# Patient Record
Sex: MALE | Race: WHITE | Employment: OTHER | URBAN - METROPOLITAN AREA
[De-identification: names, ages, dates, MRNs, and addresses within clinical notes are randomized per-mention and may not be internally consistent; named-entity substitution may affect disease eponyms.]

---

## 2017-04-03 ENCOUNTER — HOSPITAL ENCOUNTER (EMERGENCY)
Age: 72
Discharge: HOME | End: 2017-04-03
Attending: EMERGENCY MEDICINE
Payer: MEDICARE

## 2017-04-03 VITALS
HEIGHT: 72 IN | RESPIRATION RATE: 16 BRPM | BODY MASS INDEX: 23.03 KG/M2 | HEART RATE: 83 BPM | OXYGEN SATURATION: 97 % | WEIGHT: 170 LBS | SYSTOLIC BLOOD PRESSURE: 158 MMHG | TEMPERATURE: 98 F | DIASTOLIC BLOOD PRESSURE: 85 MMHG

## 2017-04-03 DIAGNOSIS — S86.112A GASTROCNEMIUS MUSCLE TEAR, LEFT, INITIAL ENCOUNTER: Primary | ICD-10-CM

## 2017-04-03 PROCEDURE — 74011250637 HC RX REV CODE- 250/637: Performed by: EMERGENCY MEDICINE

## 2017-04-03 PROCEDURE — 99284 EMERGENCY DEPT VISIT MOD MDM: CPT

## 2017-04-03 RX ORDER — NAPROXEN 250 MG/1
375 TABLET ORAL
Status: COMPLETED | OUTPATIENT
Start: 2017-04-03 | End: 2017-04-03

## 2017-04-03 RX ORDER — HYDROCODONE BITARTRATE AND ACETAMINOPHEN 5; 325 MG/1; MG/1
1 TABLET ORAL
Status: COMPLETED | OUTPATIENT
Start: 2017-04-03 | End: 2017-04-03

## 2017-04-03 RX ORDER — NAPROXEN 375 MG/1
375 TABLET ORAL 2 TIMES DAILY WITH MEALS
Qty: 20 TAB | Refills: 0 | Status: SHIPPED | OUTPATIENT
Start: 2017-04-03 | End: 2018-03-09

## 2017-04-03 RX ORDER — HYDROCODONE BITARTRATE AND ACETAMINOPHEN 5; 325 MG/1; MG/1
1 TABLET ORAL
Qty: 8 TAB | Refills: 0 | Status: SHIPPED | OUTPATIENT
Start: 2017-04-03 | End: 2018-03-09

## 2017-04-03 RX ADMIN — HYDROCODONE BITARTRATE AND ACETAMINOPHEN 1 TABLET: 5; 325 TABLET ORAL at 12:54

## 2017-04-03 RX ADMIN — NAPROXEN 375 MG: 250 TABLET ORAL at 12:55

## 2017-04-03 NOTE — DISCHARGE INSTRUCTIONS
Muscle Strain: Care Instructions  Your Care Instructions  A muscle strain happens when you overstretch, or pull, a muscle. It can happen when you exercise or lift something or when you have an accident. Rest and other home care can help the muscle heal.  Follow-up care is a key part of your treatment and safety. Be sure to make and go to all appointments, and call your doctor if you are having problems. Its also a good idea to know your test results and keep a list of the medicines you take. How can you care for yourself at home? · Rest the strained muscle. Do not put weight on it for a day or two. If your doctor advises you to, use crutches or a sling to rest a sore limb. · Put ice or a cold pack on the sore muscle for 10 to 20 minutes at a time to stop swelling. Put a thin cloth between the ice pack and your skin. · Prop up the sore arm or leg on a pillow when you ice it or anytime you sit or lie down during the next 3 days. Try to keep it above the level of your heart. This will help reduce swelling. · Take pain medicines exactly as directed. ¨ If the doctor gave you a prescription medicine for pain, take it as prescribed. ¨ If you are not taking a prescription pain medicine, ask your doctor if you can take an over-the-counter medicine. · Do not do anything that makes the pain worse. Return to exercise gradually as you feel better. When should you call for help? Call your doctor now or seek immediate medical care if:  · You have new severe pain. · Your injured limb is cool or pale or changes color. · You have tingling, weakness, or numbness in your injured limb. · You cannot move the injured area. Watch closely for changes in your health, and be sure to contact your doctor if:  · You cannot put weight on a joint, or it feels unsteady when you walk. · Pain and swelling get worse or do not start to get better after 2 days of home treatment. Where can you learn more?   Go to http://amadou-tylor.info/. Enter S521 in the search box to learn more about \"Muscle Strain: Care Instructions. \"  Current as of: May 23, 2016  Content Version: 11.2  © 9319-9180 [employer]. Care instructions adapted under license by [employer] (which disclaims liability or warranty for this information). If you have questions about a medical condition or this instruction, always ask your healthcare professional. [address] any warranty or liability for your use of this information. Calf Strain: Rehab Exercises  Your Care Instructions  Here are some examples of typical rehabilitation exercises for your condition. Start each exercise slowly. Ease off the exercise if you start to have pain. Your doctor or physical therapist will tell you when you can start these exercises and which ones will work best for you. How to do the exercises  Calf wall stretch (back knee straight)    1. Stand facing a wall with your hands on the wall at about eye level. Put your affected leg about a step behind your other leg. 2. Keeping your back leg straight and your back heel on the floor, bend your front knee and gently bring your hip and chest toward the wall until you feel a stretch in the calf of your back leg. 3. Hold the stretch for at least 15 to 30 seconds. 4. Repeat 2 to 4 times. Calf wall stretch (knees bent)    1. Stand facing a wall with your hands on the wall at about eye level. Put your affected leg about a step behind your other leg. 2. Keeping both heels on the floor, bend both knees. Then gently bring your hip and chest toward the wall until you feel a stretch in the calf of your back leg. 3. Hold the stretch for at least 15 to 30 seconds. 4. Repeat 2 to 4 times. Bilateral calf stretch (knees straight)    1. Place a book on the floor a few inches from a wall or countertop, and put the balls of your feet on it.  Your heels should be on the floor. The book needs to be thick enough so that you can feel a gentle stretch in each calf. If you are not steady on your feet, hold on to a chair, counter, or wall while you do this stretch. 2. Keep your knees straight, and lean forward until you feel a stretch in each calf. 3. To get more stretch, add another book or use a thicker book, such as a phone book, a dictionary, or an encyclopedia. 4. Hold the stretch for at least 15 to 30 seconds. 5. Repeat 2 to 4 times. Bilateral calf stretch (knees bent)    1. Place a book on the floor a few inches from a wall or countertop, and put the balls of your feet on it. Your heels should be on the floor. The book needs to be thick enough so that you can feel a gentle stretch in each calf. If you are not steady on your feet, hold on to a chair, counter, or wall while you do this stretch. 2. Bend your knees, and lean forward until you feel a stretch in each calf. 3. To get more stretch, add another book or use a thicker book, such as a phone book, a dictionary, or an encyclopedia. 4. Hold the stretch for at least 15 to 30 seconds. 5. Repeat 2 to 4 times. Ankle plantarflexion    1. Sit with your affected leg straight and supported on the floor. Your other leg should be bent, with that foot flat on the floor. 2. Keeping your affected leg straight, gently flex your foot downward so your toes are pointed away from your body. Then slowly relax your foot to the starting position. 3. Repeat 8 to 12 times. Ankle dorsiflexion    1. Sit with your affected leg straight and supported on the floor. Your other leg should be bent, with that foot flat on the floor. 2. Keeping your leg straight, gently flex your foot back so your toes point upward. Then slowly relax your foot to the starting position. 3. Repeat 8 to 12 times. Bilateral heel raises on step    1. Stand on the bottom step of a staircase, facing up toward the stairs. Put the balls of your feet on the step.  If you are not steady on your feet, hold on to the banister or wall. 2. Keeping both knees straight, slowly lift your heels above the step so that you are standing on your toes. Then slowly lower your heels below the step and toward the floor. 3. Return to the starting position, with your feet even with the step. 4. Repeat 8 to 12 times. Follow-up care is a key part of your treatment and safety. Be sure to make and go to all appointments, and call your doctor if you are having problems. It's also a good idea to know your test results and keep a list of the medicines you take. Where can you learn more? Go to http://amadou-tylor.info/. Enter V478 in the search box to learn more about \"Calf Strain: Rehab Exercises. \"  Current as of: May 23, 2016  Content Version: 11.2  © 1908-1985 [employer]. Care instructions adapted under license by [employer] (which disclaims liability or warranty for this information). If you have questions about a medical condition or this instruction, always ask your healthcare professional. [address] any warranty or liability for your use of this information.

## 2017-04-03 NOTE — ED NOTES
Patient arrived through triage c/o right calf pain after feeling a pop in the posterior lateral calf while pushing a boat on a trailer. Pain increases with plantar flexion and weight baring. Patient is resting in bed, bed in low position, call bell in reach, visitor at bedside.

## 2017-04-03 NOTE — ED PROVIDER NOTES
HPI Comments: 70 y.o. male with past medical history significant for cancer (prostate, skin) and HTN who presents with chief complaint of calf pain. Earlier today, patient was pushing a \"fishing boat off of his trailer\" when he felt a \"pop\" in his left calf. Since then, patient has complained of pain to the left calf, which markedly worsens with any plantar flexion or weight bearing. Patient otherwise denies any additional injury or complaints. There are no other acute medical concerns at this time. Social hx: +tobacco smoker; +  PCP: No primary care provider on file. Note written by Alex Mullins, as dictated by Niraj Ponce. Rayne Cespedes MD 12:35 PM    The history is provided by the patient. No past medical history on file. No past surgical history on file. No family history on file. Social History     Social History    Marital status: N/A     Spouse name: N/A    Number of children: N/A    Years of education: N/A     Occupational History    Not on file. Social History Main Topics    Smoking status: Not on file    Smokeless tobacco: Not on file    Alcohol use Not on file    Drug use: Not on file    Sexual activity: Not on file     Other Topics Concern    Not on file     Social History Narrative         ALLERGIES: Review of patient's allergies indicates not on file. Review of Systems   Constitutional: Negative for chills and fever. HENT: Negative for ear pain and sore throat. Eyes: Negative for pain. Respiratory: Negative for chest tightness and shortness of breath. Cardiovascular: Negative for chest pain and leg swelling. Gastrointestinal: Negative for abdominal pain, nausea and vomiting. Genitourinary: Negative for dysuria and flank pain. Musculoskeletal: Negative for back pain. Skin: Negative for rash. Neurological: Negative for headaches. All other systems reviewed and are negative.       Vitals:    04/03/17 1218   BP: 158/85   Pulse: 83 Resp: 16   Temp: 98 °F (36.7 °C)   SpO2: 97%   Weight: 77.1 kg (170 lb)   Height: 6' (1.829 m)            Physical Exam   Constitutional: He appears well-developed and well-nourished. No distress. HENT:   Head: Normocephalic and atraumatic. Eyes: Pupils are equal, round, and reactive to light. No scleral icterus. Neck: Normal range of motion. Neck supple. No tracheal deviation present. Cardiovascular: Normal rate, regular rhythm and normal heart sounds. Exam reveals no gallop and no friction rub. No murmur heard. Pulmonary/Chest: Effort normal and breath sounds normal. No respiratory distress. He has no wheezes. He has no rales. Abdominal: Soft. He exhibits no distension. There is no tenderness. There is no rebound and no guarding. Musculoskeletal: He exhibits deformity. He exhibits no edema. Visual deformity in the left calf. Neurological: He is alert. Weakness in plantar flexion on left. Skin: Skin is warm and dry. Psychiatric: He has a normal mood and affect. Nursing note and vitals reviewed. Note written by Alex Grace, as dictated by [facility].  Juliet Loza MD 12:35 PM    MDM  Number of Diagnoses or Management Options  Gastrocnemius muscle tear, left, initial encounter:   Diagnosis management comments: 70year old male p/w signs and symptoms of gastrocnemius tear, achilles intact    A/P: Calf strain- Naprosyn, Norco, PCP f/u, Ortho f/u PRN    ED Course       Procedures

## 2018-03-09 ENCOUNTER — HOSPITAL ENCOUNTER (INPATIENT)
Age: 73
LOS: 1 days | Discharge: HOME | DRG: 696 | End: 2018-03-10
Attending: EMERGENCY MEDICINE | Admitting: UROLOGY
Payer: MEDICARE

## 2018-03-09 ENCOUNTER — APPOINTMENT (OUTPATIENT)
Dept: CT IMAGING | Age: 73
DRG: 696 | End: 2018-03-09
Attending: EMERGENCY MEDICINE
Payer: MEDICARE

## 2018-03-09 DIAGNOSIS — E87.6 HYPOKALEMIA: ICD-10-CM

## 2018-03-09 DIAGNOSIS — R33.9 URINARY RETENTION: Primary | ICD-10-CM

## 2018-03-09 DIAGNOSIS — N32.89 BLADDER MASS: ICD-10-CM

## 2018-03-09 DIAGNOSIS — R31.0 GROSS HEMATURIA: ICD-10-CM

## 2018-03-09 LAB
ANION GAP SERPL CALC-SCNC: 12 MMOL/L (ref 5–15)
ANION GAP SERPL CALC-SCNC: 18 MMOL/L (ref 5–15)
APPEARANCE UR: ABNORMAL
ARTERIAL PATENCY WRIST A: YES
BACTERIA URNS QL MICRO: NEGATIVE /HPF
BASE EXCESS BLDA CALC-SCNC: 2.9 MMOL/L
BASOPHILS # BLD: 0.1 K/UL (ref 0–0.1)
BASOPHILS NFR BLD: 0 % (ref 0–1)
BDY SITE: ABNORMAL
BILIRUB UR QL: NEGATIVE
BREATHS.SPONTANEOUS ON VENT: 12
BUN SERPL-MCNC: 17 MG/DL (ref 6–20)
BUN SERPL-MCNC: 17 MG/DL (ref 6–20)
BUN/CREAT SERPL: 15 (ref 12–20)
BUN/CREAT SERPL: 17 (ref 12–20)
CALCIUM SERPL-MCNC: 7.8 MG/DL (ref 8.5–10.1)
CALCIUM SERPL-MCNC: 8.8 MG/DL (ref 8.5–10.1)
CHLORIDE SERPL-SCNC: 95 MMOL/L (ref 97–108)
CHLORIDE SERPL-SCNC: 98 MMOL/L (ref 97–108)
CO2 SERPL-SCNC: 19 MMOL/L (ref 21–32)
CO2 SERPL-SCNC: 23 MMOL/L (ref 21–32)
COLOR UR: ABNORMAL
CREAT SERPL-MCNC: 1.01 MG/DL (ref 0.7–1.3)
CREAT SERPL-MCNC: 1.13 MG/DL (ref 0.7–1.3)
DIFFERENTIAL METHOD BLD: ABNORMAL
EOSINOPHIL # BLD: 0.1 K/UL (ref 0–0.4)
EOSINOPHIL NFR BLD: 1 % (ref 0–7)
EPITH CASTS URNS QL MICRO: ABNORMAL /LPF
ERYTHROCYTE [DISTWIDTH] IN BLOOD BY AUTOMATED COUNT: 12.3 % (ref 11.5–14.5)
FIO2 ON VENT: 21 %
GLUCOSE SERPL-MCNC: 123 MG/DL (ref 65–100)
GLUCOSE SERPL-MCNC: 133 MG/DL (ref 65–100)
GLUCOSE UR STRIP.AUTO-MCNC: NEGATIVE MG/DL
HCO3 BLDA-SCNC: 25 MMOL/L (ref 22–26)
HCT VFR BLD AUTO: 37.6 % (ref 36.6–50.3)
HGB BLD-MCNC: 12.9 G/DL (ref 12.1–17)
HGB UR QL STRIP: ABNORMAL
IMM GRANULOCYTES # BLD: 0.1 K/UL (ref 0–0.04)
IMM GRANULOCYTES NFR BLD AUTO: 0 % (ref 0–0.5)
KETONES UR QL STRIP.AUTO: NEGATIVE MG/DL
LEUKOCYTE ESTERASE UR QL STRIP.AUTO: ABNORMAL
LYMPHOCYTES # BLD: 2.2 K/UL (ref 0.8–3.5)
LYMPHOCYTES NFR BLD: 13 % (ref 12–49)
MCH RBC QN AUTO: 30.3 PG (ref 26–34)
MCHC RBC AUTO-ENTMCNC: 34.3 G/DL (ref 30–36.5)
MCV RBC AUTO: 88.3 FL (ref 80–99)
MONOCYTES # BLD: 1.4 K/UL (ref 0–1)
MONOCYTES NFR BLD: 8 % (ref 5–13)
NEUTS SEG # BLD: 13.2 K/UL (ref 1.8–8)
NEUTS SEG NFR BLD: 77 % (ref 32–75)
NITRITE UR QL STRIP.AUTO: NEGATIVE
NRBC # BLD: 0 K/UL (ref 0–0.01)
NRBC BLD-RTO: 0 PER 100 WBC
PCO2 BLDA: 31 MMHG (ref 35–45)
PH BLDA: 7.52 [PH] (ref 7.35–7.45)
PH UR STRIP: 6.5 [PH] (ref 5–8)
PLATELET # BLD AUTO: 195 K/UL (ref 150–400)
PMV BLD AUTO: 11.1 FL (ref 8.9–12.9)
PO2 BLDA: 81 MMHG (ref 80–100)
POTASSIUM SERPL-SCNC: 3.1 MMOL/L (ref 3.5–5.1)
POTASSIUM SERPL-SCNC: 3.2 MMOL/L (ref 3.5–5.1)
PROT UR STRIP-MCNC: 100 MG/DL
RBC # BLD AUTO: 4.26 M/UL (ref 4.1–5.7)
RBC #/AREA URNS HPF: >100 /HPF (ref 0–5)
SAO2 % BLD: 97 % (ref 92–97)
SAO2% DEVICE SAO2% SENSOR NAME: ABNORMAL
SODIUM SERPL-SCNC: 132 MMOL/L (ref 136–145)
SODIUM SERPL-SCNC: 133 MMOL/L (ref 136–145)
SP GR UR REFRACTOMETRY: 1.02 (ref 1–1.03)
SPECIMEN SITE: ABNORMAL
UR CULT HOLD, URHOLD: NORMAL
UROBILINOGEN UR QL STRIP.AUTO: 0.2 EU/DL (ref 0.2–1)
WBC # BLD AUTO: 17 K/UL (ref 4.1–11.1)
WBC URNS QL MICRO: ABNORMAL /HPF (ref 0–4)

## 2018-03-09 PROCEDURE — 85025 COMPLETE CBC W/AUTO DIFF WBC: CPT | Performed by: EMERGENCY MEDICINE

## 2018-03-09 PROCEDURE — 36415 COLL VENOUS BLD VENIPUNCTURE: CPT | Performed by: EMERGENCY MEDICINE

## 2018-03-09 PROCEDURE — 74011250636 HC RX REV CODE- 250/636: Performed by: EMERGENCY MEDICINE

## 2018-03-09 PROCEDURE — 96375 TX/PRO/DX INJ NEW DRUG ADDON: CPT

## 2018-03-09 PROCEDURE — 96365 THER/PROPH/DIAG IV INF INIT: CPT

## 2018-03-09 PROCEDURE — 74176 CT ABD & PELVIS W/O CONTRAST: CPT

## 2018-03-09 PROCEDURE — 96361 HYDRATE IV INFUSION ADD-ON: CPT

## 2018-03-09 PROCEDURE — 74011250637 HC RX REV CODE- 250/637: Performed by: EMERGENCY MEDICINE

## 2018-03-09 PROCEDURE — 87086 URINE CULTURE/COLONY COUNT: CPT | Performed by: EMERGENCY MEDICINE

## 2018-03-09 PROCEDURE — 99284 EMERGENCY DEPT VISIT MOD MDM: CPT

## 2018-03-09 PROCEDURE — 80048 BASIC METABOLIC PNL TOTAL CA: CPT | Performed by: EMERGENCY MEDICINE

## 2018-03-09 PROCEDURE — 77030018836 HC SOL IRR NACL ICUM -A

## 2018-03-09 PROCEDURE — 77030005549 HC CATH URETH FOL 3W COVD -A

## 2018-03-09 PROCEDURE — 96376 TX/PRO/DX INJ SAME DRUG ADON: CPT

## 2018-03-09 PROCEDURE — 36600 WITHDRAWAL OF ARTERIAL BLOOD: CPT | Performed by: EMERGENCY MEDICINE

## 2018-03-09 PROCEDURE — 81001 URINALYSIS AUTO W/SCOPE: CPT | Performed by: EMERGENCY MEDICINE

## 2018-03-09 PROCEDURE — 77030034850

## 2018-03-09 PROCEDURE — 82803 BLOOD GASES ANY COMBINATION: CPT | Performed by: EMERGENCY MEDICINE

## 2018-03-09 PROCEDURE — 77030021163 HC TUBE CYSTO IRR ICUM -A

## 2018-03-09 PROCEDURE — 87040 BLOOD CULTURE FOR BACTERIA: CPT | Performed by: EMERGENCY MEDICINE

## 2018-03-09 PROCEDURE — 51798 US URINE CAPACITY MEASURE: CPT

## 2018-03-09 PROCEDURE — 51702 INSERT TEMP BLADDER CATH: CPT

## 2018-03-09 RX ORDER — MORPHINE SULFATE 2 MG/ML
2 INJECTION, SOLUTION INTRAMUSCULAR; INTRAVENOUS
Status: COMPLETED | OUTPATIENT
Start: 2018-03-09 | End: 2018-03-09

## 2018-03-09 RX ORDER — ASPIRIN 81 MG/1
81 TABLET ORAL DAILY
COMMUNITY

## 2018-03-09 RX ORDER — HYDROCODONE BITARTRATE AND ACETAMINOPHEN 5; 325 MG/1; MG/1
1 TABLET ORAL
Qty: 10 TAB | Refills: 0 | Status: SHIPPED | OUTPATIENT
Start: 2018-03-09

## 2018-03-09 RX ORDER — SILDENAFIL 50 MG/1
50 TABLET, FILM COATED ORAL AS NEEDED
COMMUNITY

## 2018-03-09 RX ORDER — CEFDINIR 300 MG/1
300 CAPSULE ORAL 2 TIMES DAILY
Qty: 10 CAP | Refills: 0 | Status: SHIPPED | OUTPATIENT
Start: 2018-03-09

## 2018-03-09 RX ORDER — POTASSIUM CHLORIDE 750 MG/1
20 TABLET, FILM COATED, EXTENDED RELEASE ORAL DAILY
Qty: 6 TAB | Refills: 0 | Status: SHIPPED | OUTPATIENT
Start: 2018-03-09 | End: 2018-03-12

## 2018-03-09 RX ORDER — IBUPROFEN 400 MG/1
400 TABLET ORAL
Status: COMPLETED | OUTPATIENT
Start: 2018-03-09 | End: 2018-03-09

## 2018-03-09 RX ADMIN — IBUPROFEN 400 MG: 400 TABLET, FILM COATED ORAL at 21:05

## 2018-03-09 RX ADMIN — CEFTRIAXONE SODIUM 1 G: 1 INJECTION, POWDER, FOR SOLUTION INTRAMUSCULAR; INTRAVENOUS at 21:58

## 2018-03-09 RX ADMIN — SODIUM CHLORIDE 500 ML: 900 INJECTION, SOLUTION INTRAVENOUS at 20:49

## 2018-03-09 RX ADMIN — SODIUM CHLORIDE 500 ML: 900 INJECTION, SOLUTION INTRAVENOUS at 21:58

## 2018-03-09 RX ADMIN — MORPHINE SULFATE 2 MG: 2 INJECTION, SOLUTION INTRAMUSCULAR; INTRAVENOUS at 23:26

## 2018-03-09 RX ADMIN — MORPHINE SULFATE 2 MG: 2 INJECTION, SOLUTION INTRAMUSCULAR; INTRAVENOUS at 23:47

## 2018-03-09 NOTE — IP AVS SNAPSHOT
Maggie Perez 
 
 
 [address] [place] [address] 
[phone] Patient: Lalo Christianson MRN: [id number] A check aylin indicates which time of day the medication should be taken. My Medications START taking these medications Instructions Each Dose to Equal  
 Morning Noon Evening Bedtime  
 cefdinir 300 mg capsule Commonly known as:  OMNICEF Your last dose was: Your next dose is: Take 1 Cap by mouth two (2) times a day. 300 mg  
    
   
   
   
  
 potassium chloride SR 10 mEq tablet Commonly known as:  KLOR-CON 10 Your last dose was: Your next dose is: Take 2 Tabs by mouth daily for 3 days. 20 mEq CONTINUE taking these medications Instructions Each Dose to Equal  
 Morning Noon Evening Bedtime AMBIEN 5 mg tablet Generic drug:  zolpidem Your last dose was: Your next dose is: Take 5 mg by mouth nightly as needed for Sleep.  
 5 mg AMLODIPINE PO Your last dose was: Your next dose is: Take  by mouth. aspirin delayed-release 81 mg tablet Your last dose was: Your next dose is: Take 81 mg by mouth daily. 81 mg HYDROcodone-acetaminophen 5-325 mg per tablet Commonly known as:  Mony Florian Your last dose was: Your next dose is: Take 1 Tab by mouth every four (4) hours as needed for Pain. Max Daily Amount: 6 Tabs. 1 Tab VIAGRA 50 mg tablet Generic drug:  sildenafil citrate Your last dose was: Your next dose is: Take 50 mg by mouth as needed. 50 mg Where to Get Your Medications Information on where to get these meds will be given to you by the nurse or doctor. ! Ask your nurse or doctor about these medications  
  cefdinir 300 mg capsule HYDROcodone-acetaminophen 5-325 mg per tablet  
 potassium chloride SR 10 mEq tablet

## 2018-03-09 NOTE — IP AVS SNAPSHOT
303 [facility] 
 
 
 [address] 
[phone] Patient: Tata Frank MRN: [id number] TVN:[id number] About your hospitalization You were admitted on:  March 10, 2018 You last received care in the:  SF 4M POST SURG ORT 1 You were discharged on:  March 10, 2018 Why you were hospitalized Your primary diagnosis was:  Not on File Your diagnoses also included:  Hematuria Follow-up Information Follow up With Details Comments Contact Info Mc Montoya MD Schedule an appointment as soon as possible for a visit in 3 days  Marleni Ramon [address] 
[place] [address] 
[phone] [facility] EMERGENCY DE[facility]  If symptoms worsen [address] [place] [address] 
[phone] Colin Doherty MD   380 [facility] [address] [place] [address] 
[phone] Discharge Orders None A check aylin indicates which time of day the medication should be taken. My Medications START taking these medications Instructions Each Dose to Equal  
 Morning Noon Evening Bedtime  
 cefdinir 300 mg capsule Commonly known as:  OMNICEF Your last dose was: Your next dose is: Take 1 Cap by mouth two (2) times a day. 300 mg  
    
   
   
   
  
 potassium chloride SR 10 mEq tablet Commonly known as:  KLOR-CON 10 Your last dose was: Your next dose is: Take 2 Tabs by mouth daily for 3 days. 20 mEq CONTINUE taking these medications Instructions Each Dose to Equal  
 Morning Noon Evening Bedtime AMBIEN 5 mg tablet Generic drug:  zolpidem Your last dose was: Your next dose is: Take 5 mg by mouth nightly as needed for Sleep.  
 5 mg AMLODIPINE PO Your last dose was: Your next dose is: Take  by mouth. aspirin delayed-release 81 mg tablet Your last dose was: Your next dose is: Take 81 mg by mouth daily. 81 mg HYDROcodone-acetaminophen 5-325 mg per tablet Commonly known as:  Genny Yuan Your last dose was: Your next dose is: Take 1 Tab by mouth every four (4) hours as needed for Pain. Max Daily Amount: 6 Tabs. 1 Tab VIAGRA 50 mg tablet Generic drug:  sildenafil citrate Your last dose was: Your next dose is: Take 50 mg by mouth as needed. 50 mg Where to Get Your Medications Information on where to get these meds will be given to you by the nurse or doctor. ! Ask your nurse or doctor about these medications  
  cefdinir 300 mg capsule HYDROcodone-acetaminophen 5-325 mg per tablet  
 potassium chloride SR 10 mEq tablet Discharge Instructions Blood in the Urine: Care Instructions Your Care Instructions Blood in the urine, or hematuria, may make the urine look red, brown, or pink. There may be blood every time you urinate or just from time to time. You cannot always see blood in the urine, but it will show up in a urine test. 
Blood in the urine may be serious. It should always be checked by a doctor. Your doctor may recommend more tests, including an X-ray, a CT scan, or a cystoscopy (which lets a doctor look inside the urethra and bladder). Blood in the urine can be a sign of another problem. Common causes are bladder infections and kidney stones. An injury to your groin or your genital area can also cause bleeding in the urinary tract. Very hard exercise-such as running a marathon-can cause blood in the urine. Blood in the urine can also be a sign of kidney disease or cancer in the bladder or kidney.  Many cases of blood in the urine are caused by a harmless condition that runs in families. This is called benign familial hematuria. It does not need any treatment. Sometimes your urine may look red or brown even though it does not contain blood. For example, not getting enough fluids (dehydration), taking certain medicines, or having a liver problem can change the color of your urine. Eating foods such as beets, rhubarb, or blackberries or foods with red food coloring can make your urine look red or pink. Follow-up care is a key part of your treatment and safety. Be sure to make and go to all appointments, and call your doctor if you are having problems. It's also a good idea to know your test results and keep a list of the medicines you take. When should you call for help? Call your doctor now or seek immediate medical care if: 
· You have symptoms of a urinary infection. For example: ¨ You have pus in your urine. ¨ You have pain in your back just below your rib cage. This is called flank pain. ¨ You have a fever, chills, or body aches. ¨ It hurts to urinate. ¨ You have groin or belly pain. · You have more blood in your urine. Watch closely for changes in your health, and be sure to contact your doctor if: 
· You have new urination problems. · You do not get better as expected. Where can you learn more? Go to http://amadou-tylor.info/. Enter R827 in the search box to learn more about \"Blood in the Urine: Care Instructions. \" Current as of: May 12, 2017 Content Version: 11.4 © 6876-3495 Dubb. Care instructions adapted under license by [employer] (which disclaims liability or warranty for this information). If you have questions about a medical condition or this instruction, always ask your healthcare professional. [address] any warranty or liability for your use of this information. Hypokalemia: Care Instructions Your Care Instructions Hypokalemia (say \"mq-va-sfh-FRANCES-ian-uh\") is a low level of potassium. The heart, muscles, kidneys, and nervous system all need potassium to work well. This problem has many different causes. Kidney problems, diet, and medicines like diuretics and laxatives can cause it. So can vomiting or diarrhea. In some cases, cancer is the cause. Your doctor may do tests to find the cause of your low potassium levels. You may need medicines to bring your potassium levels back to normal. You may also need regular blood tests to check your potassium. If you have very low potassium, you may need intravenous (IV) medicines. You also may need tests to check the electrical activity of your heart. Heart problems caused by low potassium levels can be very serious. Follow-up care is a key part of your treatment and safety. Be sure to make and go to all appointments, and call your doctor if you are having problems. It's also a good idea to know your test results and keep a list of the medicines you take. How can you care for yourself at home? · If your doctor recommends it, eat foods that have a lot of potassium. These include fresh fruits, juices, and vegetables. They also include nuts, beans, and milk. · Be safe with medicines. If your doctor prescribes medicines or potassium supplements, take them exactly as directed. Call your doctor if you have any problems with your medicines. · Get your potassium levels tested as often as your doctor tells you. When should you call for help? Call 911 anytime you think you may need emergency care. For example, call if: 
? · You feel like your heart is missing beats. Heart problems caused by low potassium can cause death. ? · You passed out (lost consciousness). ? · You have a seizure. ?Call your doctor now or seek immediate medical care if: 
? · You feel weak or unusually tired. ? · You have severe arm or leg cramps. ? · You have tingling or numbness. ? · You feel sick to your stomach, or you vomit. ? · You have belly cramps. ? · You feel bloated or constipated. ? · You have to urinate a lot. ? · You feel very thirsty most of the time. ? · You are dizzy or lightheaded, or you feel like you may faint. ? · You feel depressed, or you lose touch with reality. ? Watch closely for changes in your health, and be sure to contact your doctor if: 
? · You do not get better as expected. Where can you learn more? Go to http://amadou-tylor.info/. Enter G358 in the search box to learn more about \"Hypokalemia: Care Instructions. \" Current as of: May 12, 2017 Content Version: 11.4 © 7479-1652 Sorbisense. Care instructions adapted under license by [employer] (which disclaims liability or warranty for this information). If you have questions about a medical condition or this instruction, always ask your healthcare professional. [place] [address] any warranty or liability for your use of this information. Urinary Retention: Care Instructions Your Care Instructions Urinary retention means that you aren't able to urinate. In men, it is often caused by a blockage of the urinary tract from an enlarged prostate gland. In men and women, it can also be caused by an infection or nerve damage. Or it may be a side effect of a medicine. The doctor may have drained the urine from your bladder. If you still have problems passing urine, you may need to use a catheter at home. This is used to empty your bladder until the problem can be fixed. Your doctor may put a catheter in your bladder before you go home. If so, he or she will tell you when to come back to have the catheter removed. The doctor has checked you closely. But problems can develop later. If you notice any problems or new symptoms, get medical treatment right away. Follow-up care is a key part of your treatment and safety.  Be sure to make and go to all appointments, and call your doctor if you are having problems. It's also a good idea to know your test results and keep a list of the medicines you take. How can you care for yourself at home? · Take your medicines exactly as prescribed. Call your doctor if you think you are having a problem with your medicine. You will get more details on the specific medicines your doctor prescribes. · Check with your doctor before you use any over-the-counter medicines. Many cold and allergy medicines, for example, can make this problem worse. Make sure your doctor knows all of the medicines, vitamins, supplements, and herbal remedies you take. · Spread out through the day the amount of fluid you drink. Do not drink a lot at bedtime. · Avoid alcohol and caffeine. · If you have been given a catheter, or if one is already in place, follow the instructions you were given. Always wash your hands before and after you handle the catheter. When should you call for help? Call your doctor now or seek immediate medical care if: 
? · You cannot urinate at all, or it is getting harder to urinate. ? · You have symptoms of a urinary tract infection. These may include: 
¨ Pain or burning when you urinate. ¨ A frequent need to urinate without being able to pass much urine. ¨ Pain in the flank, which is just below the rib cage and above the waist on either side of the back. ¨ Blood in your urine. ¨ A fever. ? Watch closely for changes in your health, and be sure to contact your doctor if: 
? · You have any problems with your catheter. ? · You do not get better as expected. Where can you learn more? Go to http://amadou-tylor.info/. Enter M244 in the search box to learn more about \"Urinary Retention: Care Instructions. \" Current as of: May 12, 2017 Content Version: 11.4 © 6820-8154 Healthwise, Incorporated.  Care instructions adapted under license by Jocelyn Lara (which disclaims liability or warranty for this information). If you have questions about a medical condition or this instruction, always ask your healthcare professional. [address] any warranty or liability for your use of this information. [employer] Announcement We are excited to announce that we are making your provider's discharge notes available to you in [employer]. You will see these notes when they are completed and signed by the physician that discharged you from your recent hospital stay. If you have any questions or concerns about any information you see in [employer], please call the Health Information Department where you were seen or reach out to your Primary Care Provider for more information about your plan of care. Introducing [facility]! [facility] introduces [employer] patient portal. Now you can access parts of your medical record, email your doctor's office, and request medication refills online. 1. In your internet browser, go to [email or web address] 2. Click on the First Time User? Click Here link in the Sign In box. You will see the New Member Sign Up page. 3. Enter your [employer] Access Code exactly as it appears below. You will not need to use this code after youve completed the sign-up process. If you do not sign up before the expiration date, you must request a new code. · [employer] Access Code: [email or web address] Expires: 6/8/2018 11:26 AM 
 
4. Enter the last four digits of your Social Security Number (xxxx) and Date of Birth (mm/dd/yyyy) as indicated and click Submit. You will be taken to the next sign-up page. 5. Create a [employer] ID. This will be your [employer] login ID and cannot be changed, so think of one that is secure and easy to remember. 6. Create a [employer] password. You can change your password at any time. 7. Enter your Password Reset Question and Answer. This can be used at a later time if you forget your password. 8. Enter your e-mail address. You will receive e-mail notification when new information is available in [address] 9. Click Sign Up. You can now view and download portions of your medical record. 10. Click the Download Summary menu link to download a portable copy of your medical information. If you have questions, please visit the Frequently Asked Questions section of the I[employer] website. Remember, [employer] is NOT to be used for urgent needs. For medical emergencies, dial 911. Now available from your iPhone and Android! Unresulted Labs-Please follow up with your PCP about these lab tests Order Current Status CULTURE, URINE In process CULTURE, BLOOD Preliminary result Providers Seen During Your Hospitalization Provider Specialty Primary office phone Yasmany Dee MD Emergency Medicine [phone] Roni Adkins MD Emergency Medicine [phone] Stuart uDrant MD Urology [phone] Your Primary Care Physician (PCP) Primary Care Physician Office Phone Office Fax Riki Haroeker [phone] You are allergic to the following No active allergies Recent Documentation Height Weight BMI Smoking Status 1.829 m 74.8 kg 22.38 kg/m2 Former Smoker Emergency Contacts Name Discharge Info Relation Home Work Mobile [facility] DISCHARGE CAREGIVER [3] Spouse [3]   [phone] Patient Belongings The following personal items are in your possession at time of discharge: 
  Dental Appliances: None  Visual Aid: Glasses, With patient      Home Medications: None   Jewelry: None  Clothing: Shirt, Footwear, Socks, Jacket/Coat, At bedside    Other Valuables: [employer], At bedside Discharge Instructions Attachments/References CARE FOR AN INDWELLING URINARY CATHETER: GENERAL INFO (ENGLISH) Patient Handouts Learning About How to Care for an Indwelling Urinary Catheter A urinary catheter is a flexible plastic tube used to drain urine from the bladder when a person cannot urinate. A doctor will place the catheter into the bladder by inserting it through the urethra. The urethra is the opening that carries urine from the bladder to the outside of the body. When the catheter is in the bladder, a small balloon is used to keep the catheter in place. The catheter lets urine drain from the bladder into a bag. The bag is usually attached to the thigh. Urinary catheters can be used in both men and women. A catheter that stays in place for a longer period of time is called an indwelling catheter. A catheter may be needed because of certain medical conditions. These include an enlarged prostate or problems controlling urine. It may be used after surgery on the pelvis or urinary tract. Urinary catheters are also used when the lower part of the body is paralyzed. When helping a loved one with a catheter, try to be relaxed. Caring for a catheter can be embarrassing for both of you. This may be especially true if you are caring for someone of the opposite sex. If you are calm and don't seem embarrassed, the person may feel more comfortable. How do you take care of the catheter? Wear disposable gloves when handling someone's catheter. Make sure to follow all of the instructions the doctor has given. And always wash your hands before and after you're done. Here are some other things to remember when caring for someone's catheter: · Make sure that urine is running out of the catheter into the urine collection bag. And make sure that the catheter tubing does not get twisted or bent. · Keep the urine collection bag below the level of the bladder. At night it may be helpful to hang the bag on the side of the bed. · Make sure that the urine collection bag does not drag and pull on the catheter. · It is okay to shower with a catheter and urine collection bag in place, unless the doctor says not to. · Check for swelling or signs of infection in the area around the catheter. Signs of infection include pus or irritated, swollen, red, or tender skin. · Clean the area around the catheter twice a day with water. Dry with a clean towel afterward. · Do not apply powder or lotion to the skin around the catheter. · Do not tug or pull on the catheter. · Sexual intercourse may still be possible for individuals who wear a catheter. It is best to talk with a doctor about options. How do you empty the bag? The urine collection bag needs to be emptied regularly. It is best to empty the bag when it's about half full or at bedtime. If the doctor has asked you to measure the amount of urine, do that before you empty the urine into the toilet. When you are ready to empty the bag, follow these steps: 1. Put on disposable gloves. 2. Remove the drain spout from its sleeve at the bottom of the collection bag. Open the valve on the spout. 3. Let the urine flow out of the bag and into the toilet or a container. Do not let the tubing or drain spout touch anything. 4. After you empty the bag, wipe off any liquid on the end of the drain spout. Close the valve and put the drain spout back into its sleeve. 5. Remove your gloves and throw them away. 6. Wash your hands with soap and water. How do you care for someone after the catheter is removed? After the catheter is taken out, the person may have trouble urinating. If this happens, try helping them sit in a few inches of warm water (sitz bath). If the urge to urinate comes during the sitz bath, it may be easier for them to urinate while still in the bath. Some burning may happen the first few times the person urinates. If the burning lasts longer, it may be a sign of an infection. If the catheter causes irritation or a rash, wearing loose, cotton underwear may help. Watch closely for changes in the person's health, and be sure to contact their doctor if you notice any problems. Where can you learn more? Go to http://amadou-tylor.info/. Enter M240 in the search box to learn more about \"Learning About How to Care for an Indwelling Urinary Catheter. \" Current as of: September 24, 2016 Content Version: 11.4 © 3922-8616 Healthwise, Incorporated. Care instructions adapted under license by [employer] (which disclaims liability or warranty for this information). If you have questions about a medical condition or this instruction, always ask your healthcare professional. [address] any warranty or liability for your use of this information. Please provide this summary of care documentation to your next provider. Signatures-by signing, you are acknowledging that this After Visit Summary has been reviewed with you and you have received a copy. Patient Signature:  ____________________________________________________________ Date:  ____________________________________________________________  
  
Edin Sylvester Provider Signature:  ____________________________________________________________ Date:  ____________________________________________________________

## 2018-03-10 VITALS
SYSTOLIC BLOOD PRESSURE: 132 MMHG | HEART RATE: 79 BPM | HEIGHT: 72 IN | BODY MASS INDEX: 22.35 KG/M2 | DIASTOLIC BLOOD PRESSURE: 69 MMHG | OXYGEN SATURATION: 96 % | RESPIRATION RATE: 18 BRPM | WEIGHT: 165 LBS | TEMPERATURE: 99 F

## 2018-03-10 PROBLEM — R31.9 HEMATURIA: Status: ACTIVE | Noted: 2018-03-10

## 2018-03-10 LAB
ANION GAP SERPL CALC-SCNC: 9 MMOL/L (ref 5–15)
BUN SERPL-MCNC: 14 MG/DL (ref 6–20)
BUN/CREAT SERPL: 13 (ref 12–20)
CALCIUM SERPL-MCNC: 8.2 MG/DL (ref 8.5–10.1)
CHLORIDE SERPL-SCNC: 105 MMOL/L (ref 97–108)
CO2 SERPL-SCNC: 25 MMOL/L (ref 21–32)
CREAT SERPL-MCNC: 1.05 MG/DL (ref 0.7–1.3)
ERYTHROCYTE [DISTWIDTH] IN BLOOD BY AUTOMATED COUNT: 12.4 % (ref 11.5–14.5)
GLUCOSE SERPL-MCNC: 134 MG/DL (ref 65–100)
HCT VFR BLD AUTO: 33.1 % (ref 36.6–50.3)
HGB BLD-MCNC: 11.4 G/DL (ref 12.1–17)
MCH RBC QN AUTO: 31.1 PG (ref 26–34)
MCHC RBC AUTO-ENTMCNC: 34.4 G/DL (ref 30–36.5)
MCV RBC AUTO: 90.2 FL (ref 80–99)
NRBC # BLD: 0 K/UL (ref 0–0.01)
NRBC BLD-RTO: 0 PER 100 WBC
PLATELET # BLD AUTO: 150 K/UL (ref 150–400)
PMV BLD AUTO: 11 FL (ref 8.9–12.9)
POTASSIUM SERPL-SCNC: 3.8 MMOL/L (ref 3.5–5.1)
RBC # BLD AUTO: 3.67 M/UL (ref 4.1–5.7)
SODIUM SERPL-SCNC: 139 MMOL/L (ref 136–145)
WBC # BLD AUTO: 13.8 K/UL (ref 4.1–11.1)

## 2018-03-10 PROCEDURE — 77030034696 HC CATH URETH FOL 2W BARD -A

## 2018-03-10 PROCEDURE — 77030005546 HC CATH URETH FOL 3W BARD -A

## 2018-03-10 PROCEDURE — 36415 COLL VENOUS BLD VENIPUNCTURE: CPT | Performed by: UROLOGY

## 2018-03-10 PROCEDURE — 74011250636 HC RX REV CODE- 250/636: Performed by: UROLOGY

## 2018-03-10 PROCEDURE — 74011000250 HC RX REV CODE- 250: Performed by: EMERGENCY MEDICINE

## 2018-03-10 PROCEDURE — 74011250637 HC RX REV CODE- 250/637: Performed by: UROLOGY

## 2018-03-10 PROCEDURE — 65270000029 HC RM PRIVATE

## 2018-03-10 PROCEDURE — 85027 COMPLETE CBC AUTOMATED: CPT | Performed by: UROLOGY

## 2018-03-10 PROCEDURE — 80048 BASIC METABOLIC PNL TOTAL CA: CPT | Performed by: UROLOGY

## 2018-03-10 PROCEDURE — 96376 TX/PRO/DX INJ SAME DRUG ADON: CPT

## 2018-03-10 PROCEDURE — 77030034850

## 2018-03-10 PROCEDURE — 74011250636 HC RX REV CODE- 250/636

## 2018-03-10 RX ORDER — MORPHINE SULFATE 4 MG/ML
4 INJECTION INTRAVENOUS
Status: COMPLETED | OUTPATIENT
Start: 2018-03-10 | End: 2018-03-10

## 2018-03-10 RX ORDER — OXYCODONE AND ACETAMINOPHEN 10; 325 MG/1; MG/1
1 TABLET ORAL
Status: DISCONTINUED | OUTPATIENT
Start: 2018-03-10 | End: 2018-03-10 | Stop reason: HOSPADM

## 2018-03-10 RX ORDER — LIDOCAINE HYDROCHLORIDE 20 MG/ML
JELLY TOPICAL
Status: COMPLETED | OUTPATIENT
Start: 2018-03-10 | End: 2018-03-10

## 2018-03-10 RX ORDER — ZOLPIDEM TARTRATE 5 MG/1
5 TABLET ORAL
COMMUNITY

## 2018-03-10 RX ORDER — LIDOCAINE HYDROCHLORIDE 20 MG/ML
JELLY TOPICAL
Status: DISCONTINUED
Start: 2018-03-10 | End: 2018-03-10 | Stop reason: HOSPADM

## 2018-03-10 RX ORDER — OXYBUTYNIN CHLORIDE 5 MG/1
5 TABLET ORAL
Status: DISCONTINUED | OUTPATIENT
Start: 2018-03-10 | End: 2018-03-10 | Stop reason: HOSPADM

## 2018-03-10 RX ORDER — SODIUM CHLORIDE 0.9 % (FLUSH) 0.9 %
5-10 SYRINGE (ML) INJECTION EVERY 8 HOURS
Status: DISCONTINUED | OUTPATIENT
Start: 2018-03-10 | End: 2018-03-10 | Stop reason: HOSPADM

## 2018-03-10 RX ORDER — SODIUM CHLORIDE 0.9 % (FLUSH) 0.9 %
5-10 SYRINGE (ML) INJECTION AS NEEDED
Status: DISCONTINUED | OUTPATIENT
Start: 2018-03-10 | End: 2018-03-10 | Stop reason: HOSPADM

## 2018-03-10 RX ORDER — ONDANSETRON 2 MG/ML
4 INJECTION INTRAMUSCULAR; INTRAVENOUS
Status: DISCONTINUED | OUTPATIENT
Start: 2018-03-10 | End: 2018-03-10 | Stop reason: HOSPADM

## 2018-03-10 RX ORDER — MORPHINE SULFATE 4 MG/ML
INJECTION INTRAVENOUS
Status: DISCONTINUED
Start: 2018-03-10 | End: 2018-03-10 | Stop reason: HOSPADM

## 2018-03-10 RX ORDER — DEXTROSE, SODIUM CHLORIDE, AND POTASSIUM CHLORIDE 5; .45; .15 G/100ML; G/100ML; G/100ML
125 INJECTION INTRAVENOUS CONTINUOUS
Status: DISCONTINUED | OUTPATIENT
Start: 2018-03-10 | End: 2018-03-10 | Stop reason: HOSPADM

## 2018-03-10 RX ORDER — AMLODIPINE BESYLATE 5 MG/1
5 TABLET ORAL DAILY
Status: DISCONTINUED | OUTPATIENT
Start: 2018-03-10 | End: 2018-03-10 | Stop reason: HOSPADM

## 2018-03-10 RX ORDER — ACETAMINOPHEN 325 MG/1
650 TABLET ORAL
Status: DISCONTINUED | OUTPATIENT
Start: 2018-03-10 | End: 2018-03-10 | Stop reason: HOSPADM

## 2018-03-10 RX ADMIN — LIDOCAINE HYDROCHLORIDE: 20 JELLY TOPICAL at 01:03

## 2018-03-10 RX ADMIN — CEFTRIAXONE SODIUM 1 G: 1 INJECTION, POWDER, FOR SOLUTION INTRAMUSCULAR; INTRAVENOUS at 09:25

## 2018-03-10 RX ADMIN — MORPHINE SULFATE 4 MG: 4 INJECTION INTRAVENOUS at 01:08

## 2018-03-10 RX ADMIN — AMLODIPINE BESYLATE 5 MG: 5 TABLET ORAL at 09:24

## 2018-03-10 RX ADMIN — DEXTROSE MONOHYDRATE, SODIUM CHLORIDE, AND POTASSIUM CHLORIDE 125 ML/HR: 50; 4.5; 1.49 INJECTION, SOLUTION INTRAVENOUS at 02:41

## 2018-03-10 NOTE — ED NOTES
12:17 AM  Pt signed out to me by Dr. Endy Childs pending placement of 3-way anders and irrigation. Unable to place 3-way meeting firm resistance and increased bright red blood from urethra. 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 3-way subsequently removed. Will consult urology for further recommendations. 12:34 AM  Discussed with Dr. Millicent Coon who will come in and eval patient. Requesting anders cart and irrigation setup ready at bedside. 1:36 AM  Dr. Millicent Coon was able to place 20fr coude and irrigate out clot. Will admit patient.

## 2018-03-10 NOTE — DISCHARGE SUMMARY
Discharge Summary     Patient: Juan R Larkin MRN: [id number]  SSN: [id number]    YOB: 1945  Age: 67 y.o. Sex: male      Allergies: Review of patient's allergies indicates no known allergies. Admit Date: 3/9/2018    Discharge Date: 3/10/2018     * Admission Diagnoses:  Hematuria     * Discharge Diagnoses:   Hospital Problems as of 3/10/2018  Never Reviewed          Codes Class Noted - Resolved POA    Hematuria ICD-10-CM: R31.9  ICD-9-CM: 599.70  3/10/2018 - Present Unknown               * Procedures for this admission:   * No surgery found *      * Disposition: Home    * Discharged Condition: good    * Hospital Course:  Hematuria. Hand irrigated and cleared    Discharge Medications:   Current Discharge Medication List      START taking these medications    Details   cefdinir (OMNICEF) 300 mg capsule Take 1 Cap by mouth two (2) times a day. Qty: 10 Cap, Refills: 0      potassium chloride SR (KLOR-CON 10) 10 mEq tablet Take 2 Tabs by mouth daily for 3 days. Qty: 6 Tab, Refills: 0         CONTINUE these medications which have CHANGED    Details   HYDROcodone-acetaminophen (NORCO) 5-325 mg per tablet Take 1 Tab by mouth every four (4) hours as needed for Pain. Max Daily Amount: 6 Tabs. Qty: 10 Tab, Refills: 0    Associated Diagnoses: Urinary retention         CONTINUE these medications which have NOT CHANGED    Details   zolpidem (AMBIEN) 5 mg tablet Take 5 mg by mouth nightly as needed for Sleep. aspirin delayed-release 81 mg tablet Take 81 mg by mouth daily. sildenafil citrate (VIAGRA) 50 mg tablet Take 50 mg by mouth as needed. AMLODIPINE BESYLATE (AMLODIPINE PO) Take  by mouth. * Follow-up Care/Patient Instructions:   Activity: Activity as tolerated  Diet: Resume previous diet  Wound Care: None needed    Follow-up Information     Follow up With Details Comments Joleen Jensen MD Schedule an appointment as soon as possible for a visit in 3 days  [phone]  [phone]      [facility] EM[facility]  If symptoms worsen Manjinder Ruth [address]    Deena Raman MD     [facility]  [address] [facility]  [phone]             Signed By: Bimal Rodriguez MD     March 10, 2018

## 2018-03-10 NOTE — ED TRIAGE NOTES
Pt states he is having pain in his bladder because he can not urinate for the last several hours. Noted blood about 1300. Did pass urine a couple of times since then and noted clots. States he took Viagra at 1300 and noted the bleeding after intercourse.

## 2018-03-10 NOTE — ED PROVIDER NOTES
HPI Comments: 67 y.o. male with past medical history significant for prostate cancer and HTN who presents from home via private vehicle with chief complaint of hematuria. Pt c/o worsening hematuria and urinary retention for the past 5 hours. Pt reports that last urine output was 3-4 hours ago, but it was minimal. Pt's last nl urine output was 5 hours ago, which contained blood and blood clots. Pt is not on anticoagulants. Pt took a viagra at 1300 and notes the bleeding started after intercourse. Pt has hx of prostate cancer and had prostate surgery 6 years ago. There are no other acute medical concerns at this time. Social hx: former tobacco smoker, +occasional EtOH consumption    PCP: Devyn Smith MD    Note written by Alex Finnegan, as dictated by Tony Glass MD 7:28 PM      The history is provided by the patient. No  was used. 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 No pertinent family history. Social History     Social History    Marital status:      Spouse name: N/A    Number of children: N/A    Years of education: N/A     Occupational History    Not on file. Social History Main Topics    Smoking status: Former Smoker    Smokeless tobacco: Never Used      Comment: occational cigar    Alcohol use Yes      Comment: occational glass of wine    Drug use: Not on file    Sexual activity: Not on file     Other Topics Concern    Not on file     Social History Narrative         ALLERGIES: Review of patient's allergies indicates no known allergies. Review of Systems   Constitutional: Negative for chills and fever. HENT: Negative for congestion and sore throat. Respiratory: Negative for cough and shortness of breath.     Gastrointestinal: Negative for abdominal pain, diarrhea, nausea and vomiting. Genitourinary: Positive for decreased urine volume, difficulty urinating, hematuria and urgency. Negative for dysuria, frequency, penile pain, penile swelling and scrotal swelling. Musculoskeletal: Negative for back pain and neck pain. Skin: Negative for color change. Neurological: Negative for syncope and headaches. All other systems reviewed and are negative. Vitals:    03/09/18 1912 03/09/18 1927   BP: 148/73    Pulse: (!) 111    Resp: 18    Temp: 98 °F (36.7 °C)    SpO2: 100% 100%   Weight: 74.8 kg (165 lb)    Height: 6' (1.829 m)             Physical Exam   Constitutional: He is oriented to person, place, and time. He appears well-developed and well-nourished. No distress. hyperventilating   HENT:   Head: Normocephalic and atraumatic. Eyes: Conjunctivae are normal.   Neck: Normal range of motion. Cardiovascular: Normal rate, regular rhythm, normal heart sounds and intact distal pulses. Exam reveals no friction rub. No murmur heard. Pulmonary/Chest: Effort normal and breath sounds normal. No respiratory distress. He has no wheezes. He has no rales. He exhibits no tenderness. Abdominal: Soft. Bowel sounds are normal. He exhibits no distension. There is no tenderness. There is no rebound and no guarding. Genitourinary:   Genitourinary Comments: No blood at meatus   Musculoskeletal: Normal range of motion. Neurological: He is alert and oriented to person, place, and time. Coordination normal.   Skin: Skin is warm and dry. He is not diaphoretic. No pallor. Psychiatric: He has a normal mood and affect. His behavior is normal.   Nursing note and vitals reviewed. MDM  Number of Diagnoses or Management Options  Bladder mass:   Gross hematuria:   Hypokalemia:   Urinary retention:   Diagnosis management comments: Check for uti place anders and irrigate given urinating clots.  Discussed with need cystoscopy to look for mass given hx prostate cancer       Amount and/or Complexity of Data Reviewed  Clinical lab tests: ordered and reviewed  Tests in the radiology section of CPT®: ordered and reviewed  Obtain history from someone other than the patient: yes (wife)    Patient Progress  Patient progress: stable        ED Course       Procedures      9:12 PM  Pt afebrile rectally. + enlarged prostate. Pt with metabolic acidosis. ? Secondary to hyperventilation. Check abg       PROGRESS NOTE:  9:49 PM  Anion gap and low bicarb likely due to hyperventilation given ABG results. Repeated bmp and wnl    11:41 PM  Pt anders not draining will place 3 way.  Care turned over to dr oCnnor Nguyen

## 2018-03-10 NOTE — H&P
Patient Name: Ruben Starks  MRN: [id number]    History of Present Illness:   Patient is a 67 y.o.  male who is being seen for hematuria and clot retention. History of prostate cancer s/p brachytherapy in 2014. PSA has been less than 1 since. No prior episodes of hematuria. Former cigar smoker. No history of stones. After viagra enhanced sexual activity this afternoon began passing clots and having difficulty voiding. No fever, chills or flank pain. No prior history of difficulty voiding. Presented to ER and a CT I personally visualized reveals a distended bladder with clot, brachy seeds in the prostate, left renal cyst, no hydro. Small amt of urine with initial anders attempt by ER staff. 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 No pertinent family history. Social History   Substance Use Topics    Smoking status: Former Smoker    Smokeless tobacco: Never Used      Comment: occational cigar    Alcohol use Yes      Comment: occational glass of wine     No Known Allergies   Prior to Admission medications    Medication Sig Start Date End Date Taking? Authorizing Provider   aspirin delayed-release 81 mg tablet Take 81 mg by mouth daily. Yes Arsenio Nathan MD   sildenafil citrate (VIAGRA) 50 mg tablet Take 50 mg by mouth as needed. Yes Arsenio Nathan MD   cefdinir (OMNICEF) 300 mg capsule Take 1 Cap by mouth two (2) times a day. 3/9/18  Yes Yesica Alejandro MD   HYDROcodone-acetaminophen (NORCO) 5-325 mg per tablet Take 1 Tab by mouth every four (4) hours as needed for Pain. Max Daily Amount: 6 Tabs. 3/9/18  Yes Yesica Alejandro MD   potassium chloride SR (KLOR-CON 10) 10 mEq tablet Take 2 Tabs by mouth daily for 3 days. 3/9/18 3/12/18 Yes Yesica Alejandro MD   AMLODIPINE BESYLATE (AMLODIPINE PO) Take  by mouth.    Yes Arsenio Nathan MD Review of Systems:  A comprehensive review of systems was negative except for that written in the HPI. Objective:     Data Review (Labs):    Recent Labs      18   WBC   --   17.0*   HGB   --   12.9   MCV   --   88.3   PLT   --   195   NA  133*  132*   K  3.1*  3.2*   CREA  1.01  1.13   BUN  17  17   IPVITALS(8:)Temp (24hrs), Av.1 °F (36.7 °C), Min:98 °F (36.7 °C), Max:98.2 °F (36.8 °C)    Temp (24hrs), Av.1 °F (36.7 °C), Min:98 °F (36.7 °C), Max:98.2 °F (36.8 °C)  RRIOLAST3     Physical Exam:            General:    alert, cooperative, no distress, appears stated age                     Skin:  Normal.   Lymph nodes:  Cervical, supraclavicular, and axillary nodes normal.             Abdomen[de-identified]  abnormal findings:  tenderness mild suprapubic area, bladder palpably distended             Genitalia:  penis exam: non focal circumcised, blood at meatus          Extremities:  negative     Procedure - under sterile conditions I attempted to place a 22 as well as 24 F 3 way and was unable. I placed a 20f coude with return of 1 L urine, bloody with clots. Hand irrigated clots till clear. 10cc water in balloon. Lidocaine jelly used in urethra. Assessment:     Active Problems:    * No active hospital problems.  *        Gross hematuria  Urinary retention  Bladder neck contracture  Elevated WBC  Low K   history of prostate cancer    Plan:     Admit  NPO   Hand irrigate anders  Rocephin IV  KCL IV  Urine culture    To OR tomorrow for cysto if hematuria persists    Signed By: Ab Duenas MD                         March 10, 2018

## 2018-03-10 NOTE — PROGRESS NOTES
Primary Nurse Landry Benson RN and Amirah Banuelos RN performed a dual skin assessment on this patient No impairment noted

## 2018-03-10 NOTE — DISCHARGE INSTRUCTIONS
Blood in the Urine: Care Instructions  Your Care Instructions    Blood in the urine, or hematuria, may make the urine look red, brown, or pink. There may be blood every time you urinate or just from time to time. You cannot always see blood in the urine, but it will show up in a urine test.  Blood in the urine may be serious. It should always be checked by a doctor. Your doctor may recommend more tests, including an X-ray, a CT scan, or a cystoscopy (which lets a doctor look inside the urethra and bladder). Blood in the urine can be a sign of another problem. Common causes are bladder infections and kidney stones. An injury to your groin or your genital area can also cause bleeding in the urinary tract. Very hard exercise-such as running a marathon-can cause blood in the urine. Blood in the urine can also be a sign of kidney disease or cancer in the bladder or kidney. Many cases of blood in the urine are caused by a harmless condition that runs in families. This is called benign familial hematuria. It does not need any treatment. Sometimes your urine may look red or brown even though it does not contain blood. For example, not getting enough fluids (dehydration), taking certain medicines, or having a liver problem can change the color of your urine. Eating foods such as beets, rhubarb, or blackberries or foods with red food coloring can make your urine look red or pink. Follow-up care is a key part of your treatment and safety. Be sure to make and go to all appointments, and call your doctor if you are having problems. It's also a good idea to know your test results and keep a list of the medicines you take. When should you call for help? Call your doctor now or seek immediate medical care if:  · You have symptoms of a urinary infection. For example:  ¨ You have pus in your urine. ¨ You have pain in your back just below your rib cage. This is called flank pain.   ¨ You have a fever, chills, or body aches.  ¨ It hurts to urinate. ¨ You have groin or belly pain. · You have more blood in your urine. Watch closely for changes in your health, and be sure to contact your doctor if:  · You have new urination problems. · You do not get better as expected. Where can you learn more? Go to http://amadou-tylor.info/. Enter E069 in the search box to learn more about \"Blood in the Urine: Care Instructions. \"  Current as of: May 12, 2017  Content Version: 11.4  © 0652-4330 [email or web address]. Care instructions adapted under license by [employer] (which disclaims liability or warranty for this information). If you have questions about a medical condition or this instruction, always ask your healthcare professional. [address] any warranty or liability for your use of this information. Hypokalemia: Care Instructions  Your Care Instructions    Hypokalemia (say \"as-nt-pzx-FRANCES-ian-uh\") is a low level of potassium. The heart, muscles, kidneys, and nervous system all need potassium to work well. This problem has many different causes. Kidney problems, diet, and medicines like diuretics and laxatives can cause it. So can vomiting or diarrhea. In some cases, cancer is the cause. Your doctor may do tests to find the cause of your low potassium levels. You may need medicines to bring your potassium levels back to normal. You may also need regular blood tests to check your potassium. If you have very low potassium, you may need intravenous (IV) medicines. You also may need tests to check the electrical activity of your heart. Heart problems caused by low potassium levels can be very serious. Follow-up care is a key part of your treatment and safety. Be sure to make and go to all appointments, and call your doctor if you are having problems. It's also a good idea to know your test results and keep a list of the medicines you take.   How can you care for yourself at home? · If your doctor recommends it, eat foods that have a lot of potassium. These include fresh fruits, juices, and vegetables. They also include nuts, beans, and milk. · Be safe with medicines. If your doctor prescribes medicines or potassium supplements, take them exactly as directed. Call your doctor if you have any problems with your medicines. · Get your potassium levels tested as often as your doctor tells you. When should you call for help? Call 911 anytime you think you may need emergency care. For example, call if:  ? · You feel like your heart is missing beats. Heart problems caused by low potassium can cause death. ? · You passed out (lost consciousness). ? · You have a seizure. ?Call your doctor now or seek immediate medical care if:  ? · You feel weak or unusually tired. ? · You have severe arm or leg cramps. ? · You have tingling or numbness. ? · You feel sick to your stomach, or you vomit. ? · You have belly cramps. ? · You feel bloated or constipated. ? · You have to urinate a lot. ? · You feel very thirsty most of the time. ? · You are dizzy or lightheaded, or you feel like you may faint. ? · You feel depressed, or you lose touch with reality. ? Watch closely for changes in your health, and be sure to contact your doctor if:  ? · You do not get better as expected. Where can you learn more? Go to http://amadou-tylor.info/. Enter G358 in the search box to learn more about \"Hypokalemia: Care Instructions. \"  Current as of: May 12, 2017  Content Version: 11.4  © 6559-6091 [employer]. Care instructions adapted under license by [employer] (which disclaims liability or warranty for this information). If you have questions about a medical condition or this instruction, always ask your healthcare professional. [address] any warranty or liability for your use of this information. Urinary Retention: Care Instructions  Your Care Instructions    Urinary retention means that you aren't able to urinate. In men, it is often caused by a blockage of the urinary tract from an enlarged prostate gland. In men and women, it can also be caused by an infection or nerve damage. Or it may be a side effect of a medicine. The doctor may have drained the urine from your bladder. If you still have problems passing urine, you may need to use a catheter at home. This is used to empty your bladder until the problem can be fixed. Your doctor may put a catheter in your bladder before you go home. If so, he or she will tell you when to come back to have the catheter removed. The doctor has checked you closely. But problems can develop later. If you notice any problems or new symptoms, get medical treatment right away. Follow-up care is a key part of your treatment and safety. Be sure to make and go to all appointments, and call your doctor if you are having problems. It's also a good idea to know your test results and keep a list of the medicines you take. How can you care for yourself at home? · Take your medicines exactly as prescribed. Call your doctor if you think you are having a problem with your medicine. You will get more details on the specific medicines your doctor prescribes. · Check with your doctor before you use any over-the-counter medicines. Many cold and allergy medicines, for example, can make this problem worse. Make sure your doctor knows all of the medicines, vitamins, supplements, and herbal remedies you take. · Spread out through the day the amount of fluid you drink. Do not drink a lot at bedtime. · Avoid alcohol and caffeine. · If you have been given a catheter, or if one is already in place, follow the instructions you were given. Always wash your hands before and after you handle the catheter. When should you call for help?   Call your doctor now or seek immediate medical care if:  ? · You cannot urinate at all, or it is getting harder to urinate. ? · You have symptoms of a urinary tract infection. These may include:  ¨ Pain or burning when you urinate. ¨ A frequent need to urinate without being able to pass much urine. ¨ Pain in the flank, which is just below the rib cage and above the waist on either side of the back. ¨ Blood in your urine. ¨ A fever. ? Watch closely for changes in your health, and be sure to contact your doctor if:  ? · You have any problems with your catheter. ? · You do not get better as expected. Where can you learn more? Go to http://amadou-tylor.info/. Enter M244 in the search box to learn more about \"Urinary Retention: Care Instructions. \"  Current as of: May 12, 2017  Content Version: 11.4  © 4627-5068 [employer]. Care instructions adapted under license by [employer] (which disclaims liability or warranty for this information). If you have questions about a medical condition or this instruction, always ask your healthcare professional. [address] any warranty or liability for your use of this information.

## 2018-03-10 NOTE — PROGRESS NOTES
D/w Dr. Bernard Cranker, nurse Chinyere Hdez. Looks and feels well. Afeb. Min blood around [place] with clear urine and no more clots. PLAN:  Next dose of Rocephin. DC [place]. DC home on prn Azo. Hold ASA, etc.  F/u in office prn discussed.

## 2018-03-10 NOTE — ED NOTES
TRANSFER - OUT REPORT:    Verbal report given to PRISCILLA Ramsey(name) on Darian Delaney  being transferred to [facility](unit) for routine progression of care       Report consisted of patients Situation, Background, Assessment and   Recommendations(SBAR). Information from the following report(s) SBAR, Kardex, ED Summary, [facility] and Recent Results was reviewed with the receiving nurse. Lines:   Peripheral IV 03/09/18 Left Antecubital (Active)   Site Assessment Clean, dry, & intact 3/9/2018  8:03 PM   Phlebitis Assessment 0 3/9/2018  8:03 PM   Infiltration Assessment 0 3/9/2018  8:03 PM   Dressing Status Clean, dry, & intact 3/9/2018  8:03 PM   Dressing Type Transparent;Tape 3/9/2018  8:03 PM   Hub Color/Line Status Pink;Patent; Flushed 3/9/2018  8:03 PM   Action Taken Blood drawn 3/9/2018  8:03 PM   Alcohol Cap Used Yes 3/9/2018  8:03 PM        Opportunity for questions and clarification was provided.       Patient transported with:   MRI Interventions

## 2018-03-11 LAB
BACTERIA SPEC CULT: NORMAL
CC UR VC: NORMAL
SERVICE CMNT-IMP: NORMAL

## 2018-03-14 ENCOUNTER — HOSPITAL ENCOUNTER (EMERGENCY)
Age: 73
Discharge: HOME | End: 2018-03-14
Attending: EMERGENCY MEDICINE | Admitting: EMERGENCY MEDICINE
Payer: MEDICARE

## 2018-03-14 VITALS
RESPIRATION RATE: 18 BRPM | BODY MASS INDEX: 22.38 KG/M2 | WEIGHT: 165 LBS | TEMPERATURE: 97.8 F | SYSTOLIC BLOOD PRESSURE: 161 MMHG | HEART RATE: 102 BPM | DIASTOLIC BLOOD PRESSURE: 90 MMHG | OXYGEN SATURATION: 99 %

## 2018-03-14 DIAGNOSIS — R31.0 GROSS HEMATURIA: Primary | ICD-10-CM

## 2018-03-14 LAB
ANION GAP SERPL CALC-SCNC: 12 MMOL/L (ref 5–15)
APTT PPP: 26.6 SEC (ref 22.1–32)
BASOPHILS # BLD: 0 K/UL (ref 0–0.1)
BASOPHILS NFR BLD: 0 % (ref 0–1)
BUN SERPL-MCNC: 13 MG/DL (ref 6–20)
BUN/CREAT SERPL: 13 (ref 12–20)
CALCIUM SERPL-MCNC: 9.1 MG/DL (ref 8.5–10.1)
CHLORIDE SERPL-SCNC: 100 MMOL/L (ref 97–108)
CO2 SERPL-SCNC: 27 MMOL/L (ref 21–32)
CREAT SERPL-MCNC: 1.02 MG/DL (ref 0.7–1.3)
DIFFERENTIAL METHOD BLD: ABNORMAL
EOSINOPHIL # BLD: 0.2 K/UL (ref 0–0.4)
EOSINOPHIL NFR BLD: 3 % (ref 0–7)
ERYTHROCYTE [DISTWIDTH] IN BLOOD BY AUTOMATED COUNT: 12.3 % (ref 11.5–14.5)
GLUCOSE SERPL-MCNC: 111 MG/DL (ref 65–100)
HCT VFR BLD AUTO: 34.8 % (ref 36.6–50.3)
HGB BLD-MCNC: 11.6 G/DL (ref 12.1–17)
IMM GRANULOCYTES # BLD: 0 K/UL (ref 0–0.04)
IMM GRANULOCYTES NFR BLD AUTO: 0 % (ref 0–0.5)
INR PPP: 1 (ref 0.9–1.1)
LYMPHOCYTES # BLD: 2.5 K/UL (ref 0.8–3.5)
LYMPHOCYTES NFR BLD: 28 % (ref 12–49)
MCH RBC QN AUTO: 30.1 PG (ref 26–34)
MCHC RBC AUTO-ENTMCNC: 33.3 G/DL (ref 30–36.5)
MCV RBC AUTO: 90.4 FL (ref 80–99)
MONOCYTES # BLD: 1 K/UL (ref 0–1)
MONOCYTES NFR BLD: 11 % (ref 5–13)
NEUTS SEG # BLD: 5.2 K/UL (ref 1.8–8)
NEUTS SEG NFR BLD: 58 % (ref 32–75)
NRBC # BLD: 0 K/UL (ref 0–0.01)
NRBC BLD-RTO: 0 PER 100 WBC
PLATELET # BLD AUTO: 191 K/UL (ref 150–400)
PMV BLD AUTO: 11 FL (ref 8.9–12.9)
POTASSIUM SERPL-SCNC: 3.6 MMOL/L (ref 3.5–5.1)
PROTHROMBIN TIME: 10.2 SEC (ref 9–11.1)
RBC # BLD AUTO: 3.85 M/UL (ref 4.1–5.7)
SODIUM SERPL-SCNC: 139 MMOL/L (ref 136–145)
THERAPEUTIC RANGE,PTTT: NORMAL SECS (ref 58–77)
WBC # BLD AUTO: 9 K/UL (ref 4.1–11.1)

## 2018-03-14 PROCEDURE — 36415 COLL VENOUS BLD VENIPUNCTURE: CPT | Performed by: EMERGENCY MEDICINE

## 2018-03-14 PROCEDURE — 99283 EMERGENCY DEPT VISIT LOW MDM: CPT

## 2018-03-14 PROCEDURE — 96360 HYDRATION IV INFUSION INIT: CPT

## 2018-03-14 PROCEDURE — 86900 BLOOD TYPING SEROLOGIC ABO: CPT | Performed by: EMERGENCY MEDICINE

## 2018-03-14 PROCEDURE — 80048 BASIC METABOLIC PNL TOTAL CA: CPT | Performed by: EMERGENCY MEDICINE

## 2018-03-14 PROCEDURE — 96361 HYDRATE IV INFUSION ADD-ON: CPT

## 2018-03-14 PROCEDURE — 77030034696 HC CATH URETH FOL 2W BARD -A

## 2018-03-14 PROCEDURE — 85610 PROTHROMBIN TIME: CPT | Performed by: EMERGENCY MEDICINE

## 2018-03-14 PROCEDURE — 85025 COMPLETE CBC W/AUTO DIFF WBC: CPT | Performed by: EMERGENCY MEDICINE

## 2018-03-14 PROCEDURE — 85730 THROMBOPLASTIN TIME PARTIAL: CPT | Performed by: EMERGENCY MEDICINE

## 2018-03-14 PROCEDURE — 77030034850

## 2018-03-14 PROCEDURE — 74011250636 HC RX REV CODE- 250/636: Performed by: EMERGENCY MEDICINE

## 2018-03-14 PROCEDURE — 51702 INSERT TEMP BLADDER CATH: CPT

## 2018-03-14 RX ORDER — LIDOCAINE HYDROCHLORIDE 20 MG/ML
JELLY TOPICAL
Status: DISCONTINUED
Start: 2018-03-14 | End: 2018-03-14 | Stop reason: HOSPADM

## 2018-03-14 RX ORDER — SODIUM CHLORIDE 0.9 % (FLUSH) 0.9 %
5-10 SYRINGE (ML) INJECTION AS NEEDED
Status: DISCONTINUED | OUTPATIENT
Start: 2018-03-14 | End: 2018-03-14 | Stop reason: HOSPADM

## 2018-03-14 RX ORDER — TAMSULOSIN HYDROCHLORIDE 0.4 MG/1
0.4 CAPSULE ORAL DAILY
Qty: 14 CAP | Refills: 0 | Status: SHIPPED | OUTPATIENT
Start: 2018-03-14 | End: 2018-03-28

## 2018-03-14 RX ORDER — CIPROFLOXACIN 500 MG/1
500 TABLET ORAL 2 TIMES DAILY
Qty: 28 TAB | Refills: 0 | Status: SHIPPED | OUTPATIENT
Start: 2018-03-14 | End: 2018-03-28

## 2018-03-14 RX ORDER — SODIUM CHLORIDE 0.9 % (FLUSH) 0.9 %
5-10 SYRINGE (ML) INJECTION EVERY 8 HOURS
Status: DISCONTINUED | OUTPATIENT
Start: 2018-03-14 | End: 2018-03-14 | Stop reason: HOSPADM

## 2018-03-14 RX ADMIN — SODIUM CHLORIDE 1000 ML: 900 INJECTION, SOLUTION INTRAVENOUS at 17:21

## 2018-03-14 NOTE — ED TRIAGE NOTES
Large amount of bright red blood from penis, recently seen for same and has procedure scheduled with urology for next Tuesday.

## 2018-03-14 NOTE — ED PROVIDER NOTES
HPI Comments: 67 y.o. male with past medical history significant for prostate cancer and HTN who presents ambulatory from home accompanied by his wife with chief complaint of hematuria. Pt reports kuldeep blood draining from his penis onset 35 minutes prior to arrival (1612). Pt states he was evaluated in the ED for the same 5 days ago (3/9/18) in which his penis was flushed using 20 gauge catheter with complete symptom resolution. Pt states CT at that time was normal. Pt states he was evaluated by Dr. Catherine Mendoza, urology, in his office yesterday for f/u. Pt states he is scheduled for a scope next week and has his pre-op appointment tomorrow. Pt denies history of the present symptoms prior to episode last week. Pt notes complete resolution of prostate cancer with radiation seeding several years ago. Pt specifically denies penile pain and abd pain. There are no other acute medical concerns at this time. Old Chart Review:  Pt was admitted to the hospital from 3/9/18-3/10/18 with hematuria and urinary retention. Drained 1L urine with clots using catheter. Social hx: former tobacco smoker; occasional EtOH use;   PCP: Alexx Ferrara MD  Urology: Yamini Child MD [facility])    Note written by Alex Verdugo, as dictated by Fernanda Messina MD 4:53 PM         The history is provided by the patient and medical records. No  was used. 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 Social History     Social History    Marital status:      Spouse name: N/A    Number of children: N/A    Years of education: N/A     Occupational History    Not on file.      Social History Main Topics    Smoking status: Former Smoker    Smokeless tobacco: Never Used      Comment: occational cigar    Alcohol use Yes      Comment: occational glass of wine    Drug use: Not on file    Sexual activity: Not on file     Other Topics Concern    Not on file     Social History Narrative         ALLERGIES: Review of patient's allergies indicates no known allergies. Review of Systems   Constitutional: Negative. Negative for appetite change, fever and unexpected weight change. HENT: Negative. Negative for ear pain, hearing loss, nosebleeds, rhinorrhea, sore throat and trouble swallowing. Respiratory: Negative. Negative for cough, chest tightness and shortness of breath. Cardiovascular: Negative. Negative for chest pain and palpitations. Gastrointestinal: Negative. Negative for abdominal distention, abdominal pain, blood in stool and vomiting. Endocrine: Negative. Genitourinary: Positive for hematuria. Negative for dysuria, penile pain, penile swelling, scrotal swelling and testicular pain. Musculoskeletal: Negative. Negative for back pain and myalgias. Skin: Negative. Negative for rash. Allergic/Immunologic: Negative. Neurological: Negative. Negative for dizziness, syncope, weakness and numbness. Hematological: Negative. Psychiatric/Behavioral: Negative. All other systems reviewed and are negative. Vitals:    03/14/18 1642   BP: 161/90   Pulse: (!) 102   Resp: 18   Temp: 97.8 °F (36.6 °C)   SpO2: 99%   Weight: 74.8 kg (165 lb)            Physical Exam   Constitutional: He is oriented to person, place, and time. He appears well-developed and well-nourished. No distress. HENT:   Head: Normocephalic and atraumatic. Right Ear: External ear normal.   Left Ear: External ear normal.   Nose: Nose normal.   Mouth/Throat: Oropharynx is clear and moist.   Eyes: Conjunctivae and EOM are normal. Pupils are equal, round, and reactive to light. Neck: Normal range of motion. Neck supple. No JVD present. No thyromegaly present.    Cardiovascular: Normal rate, regular rhythm, normal heart sounds and intact distal pulses. No murmur heard. Pulmonary/Chest: Effort normal and breath sounds normal. No respiratory distress. He has no wheezes. He has no rales. Abdominal: Soft. Bowel sounds are normal. He exhibits no distension. There is no tenderness. Flat, soft, non-tender abdomen. Genitourinary:   Genitourinary Comments: Gross blood from penile medius. No testicular swelling or tenderness. Musculoskeletal: Normal range of motion. He exhibits no edema. Neurological: He is alert and oriented to person, place, and time. No cranial nerve deficit. Skin: Skin is warm and dry. No rash noted. Psychiatric: He has a normal mood and affect. His behavior is normal. Thought content normal.   Nursing note and vitals reviewed. Note written by Alex Becerra, as dictated by Marie Ayala MD 4:53 PM      [facility]      ED Course       Procedures    CONSULT NOTE:  5:13 PM Marie Ayala MD spoke with Dr. Prasanna Marlow, Consult for Urology. Discussed available diagnostic tests and clinical findings. Dr. Floridalma Humphreys will evaluate patient in the ED.    7:03 PM  Dr. Prasanna Marlow, urology, evaluated patient in the ED. He is recommending discharge home with 14-day course of cipro to empirically treat prostatitis. Leave catheter in place. He will f/u with patient in the office. Patient's results have been reviewed with them. Patient and/or family have verbally conveyed their understanding and agreement of the patient's signs, symptoms, diagnosis, treatment and prognosis and additionally agree to follow up as recommended or return to the Emergency Room should their condition change prior to follow-up. Discharge instructions have also been provided to the patient with some educational information regarding their diagnosis as well a list of reasons why they would want to return to the ER prior to their follow-up appointment should their condition change.

## 2018-03-14 NOTE — ED NOTES
Pt changed to leg bag. Given supplies to clean and wrap. Still has a large amount of blood that is difficult to clean. Pt amb w/wife out of ED.

## 2018-03-14 NOTE — DISCHARGE INSTRUCTIONS
Blood in the Urine: Care Instructions  Your Care Instructions    Blood in the urine, or hematuria, may make the urine look red, brown, or pink. There may be blood every time you urinate or just from time to time. You cannot always see blood in the urine, but it will show up in a urine test.  Blood in the urine may be serious. It should always be checked by a doctor. Your doctor may recommend more tests, including an X-ray, a CT scan, or a cystoscopy (which lets a doctor look inside the urethra and bladder). Blood in the urine can be a sign of another problem. Common causes are bladder infections and kidney stones. An injury to your groin or your genital area can also cause bleeding in the urinary tract. Very hard exercise-such as running a marathon-can cause blood in the urine. Blood in the urine can also be a sign of kidney disease or cancer in the bladder or kidney. Many cases of blood in the urine are caused by a harmless condition that runs in families. This is called benign familial hematuria. It does not need any treatment. Sometimes your urine may look red or brown even though it does not contain blood. For example, not getting enough fluids (dehydration), taking certain medicines, or having a liver problem can change the color of your urine. Eating foods such as beets, rhubarb, or blackberries or foods with red food coloring can make your urine look red or pink. Follow-up care is a key part of your treatment and safety. Be sure to make and go to all appointments, and call your doctor if you are having problems. It's also a good idea to know your test results and keep a list of the medicines you take. When should you call for help? Call your doctor now or seek immediate medical care if:  · You have symptoms of a urinary infection. For example:  ¨ You have pus in your urine. ¨ You have pain in your back just below your rib cage. This is called flank pain.   ¨ You have a fever, chills, or body aches.  ¨ It hurts to urinate. ¨ You have groin or belly pain. · You have more blood in your urine. Watch closely for changes in your health, and be sure to contact your doctor if:  · You have new urination problems. · You do not get better as expected. Where can you learn more? Go to http://amadou-tylor.info/. Enter Q579 in the search box to learn more about \"Blood in the Urine: Care Instructions. \"  Current as of: May 12, 2017  Content Version: 11.4  © 8362-7751 [employer]. Care instructions adapted under license by [employer] (which disclaims liability or warranty for this information). If you have questions about a medical condition or this instruction, always ask your healthcare professional. [address] any warranty or liability for your use of this information. Cystoscopy: Before Your Procedure  What is a cystoscopy? A cystoscopy is a procedure that lets a doctor look inside your bladder and urethra. The urethra is the tube that carries urine from the bladder to outside the body. The doctor uses a thin, lighted tool called a cystoscope. With this tool, he or she can look for kidney or bladder stones. The doctor can also look for tumors, bleeding, or infection. If you are in a clinic and you are awake, you will get gel to numb your urethra. This makes the procedure more comfortable. Then the doctor puts the tube into your urethra and moves it into your bladder. Next, the doctor fills your bladder with liquid. This helps him or her see better. It may cause you to feel pressure in your bladder area for a short time. If you are in the hospital, you may get medicine to make you sleep during the procedure. While you are asleep, the doctor can take samples of tissue. These will be checked for cancer and other problems. This is called a biopsy.  If you have a biopsy, you may have a small amount of blood in your urine for several days. You may also need a catheter. It's a tube that drains urine from your bladder. Your doctor will take it out at your follow-up visit. Follow-up care is a key part of your treatment and safety. Be sure to make and go to all appointments, and call your doctor if you are having problems. It's also a good idea to know your test results and keep a list of the medicines you take. What happens before the procedure? ?Preparing for the procedure  ? · Understand exactly what procedure is planned, along with the risks, benefits, and other options. · Tell your doctors ALL the medicines, vitamins, supplements, and herbal remedies you take. Some of these can increase the risk of bleeding or interact with anesthesia. ? · If you take blood thinners, such as warfarin (Coumadin), clopidogrel (Plavix), or aspirin, be sure to talk to your doctor. He or she will tell you if you should stop taking these medicines before your procedure. Make sure that you understand exactly what your doctor wants you to do.   ? · Your doctor will tell you which medicines to take or stop before your procedure. You may need to stop taking certain medicines a week or more before the procedure. So talk to your doctor as soon as you can.   ? · If you have an advance directive, let your doctor know. It may include a living will and a durable power of  for health care. Bring a copy to the hospital. If you don't have one, you may want to prepare one. It lets your doctor and loved ones know your health care wishes. Doctors advise that everyone prepare these papers before any type of surgery or procedure. Procedures can be stressful. This information will help you understand what you can expect. And it will help you safely prepare for your procedure. What happens on the day of the procedure? · Follow the instructions exactly about when to stop eating and drinking. If you don't, your procedure may be canceled.  If your doctor told you to take your medicines on the day of your procedure, take them with only a sip of water. ? · Take a bath or shower before you come in for your procedure. Do not apply lotions, perfumes, deodorants, or nail polish. ? · Take off all jewelry and piercings. And take out contact lenses, if you wear them. ? At the hospital or surgery center   · Bring a picture ID. ? · You will be asked to empty your bladder just before the procedure. ? · You will be kept comfortable and safe by your anesthesia provider. The anesthesia may make you sleep. Or it may just numb the area being worked on. ? · In most cases, the cystoscope is in the bladder for less than 10 minutes. But the entire test may take up to 45 minutes or longer. Going home   · Be sure you have someone to drive you home. Anesthesia and pain medicine make it unsafe for you to drive. ? · You will be given more specific instructions about recovering from your procedure. They will cover things like diet, wound care, follow-up care, driving, and getting back to your normal routine. When should you call your doctor? · You have questions or concerns. ? · You don't understand how to prepare for your procedure. ? · You become ill before the procedure (such as fever, flu, or a cold). ? · You need to reschedule or have changed your mind about having the procedure. Where can you learn more? Go to http://amadou-tylor.info/. Enter H501 in the search box to learn more about \"Cystoscopy: Before Your Procedure. \"  Current as of: May 12, 2017  Content Version: 11.4  © 3213-4944 [employer]. Care instructions adapted under license by [employer] (which disclaims liability or warranty for this information). If you have questions about a medical condition or this instruction, always ask your healthcare professional. [address] any warranty or liability for your use of this information.          We hope that we have addressed all of your medical concerns. The examination and treatment you received in the Emergency Department were for an emergent problem and were not intended as complete care. It is important that you follow up with your healthcare provider(s) for ongoing care. If your symptoms worsen or do not improve as expected, and you are unable to reach your usual health care provider(s), you should return to the Emergency Department. Today's healthcare is undergoing tremendous change, and patient satisfaction surveys are one of the many tools to assess the quality of medical care. You may receive a survey from the [place] regarding your experience in the Emergency Department. I hope that your experience has been completely positive, particularly the medical care that I provided. As such, please participate in the survey; anything less than excellent does not meet my expectations or intentions. [facility] and [address] [place] [address] participate in nationally recognized quality of care measures. If your blood pressure is greater than 120/80, as reported below, we urge that you seek medical care to address the potential of high blood pressure, commonly known as hypertension. Hypertension can be hereditary or can be caused by certain medical conditions, pain, stress, or \"white coat syndrome. \"       Please make an appointment with your health care provider(s) for follow up of your Emergency Department visit. VITALS:   Patient Vitals for the past 8 hrs:   Temp Pulse Resp BP SpO2   03/14/18 1642 97.8 °F (36.6 °C) (!) 102 18 161/90 99 %          Thank you for allowing us to provide you with medical care today. We realize that you have many choices for your emergency care needs. Please choose us in the future for any continued health care needs. Regards,           Eulalio Powell Found, [address]   Office: [phone]            Recent Results (from the past 24 hour(s))   PTT    Collection Time: 03/14/18  5:14 PM   Result Value Ref Range    aPTT 26.6 22.1 - 32.0 sec    aPTT, therapeutic range     58.0 - 77.0 SECS   PROTHROMBIN TIME + INR    Collection Time: 03/14/18  5:14 PM   Result Value Ref Range    INR 1.0 0.9 - 1.1      Prothrombin time 10.2 9.0 - 81.4 sec   METABOLIC PANEL, BASIC    Collection Time: 03/14/18  5:14 PM   Result Value Ref Range    Sodium 139 136 - 145 mmol/L    Potassium 3.6 3.5 - 5.1 mmol/L    Chloride 100 97 - 108 mmol/L    CO2 27 21 - 32 mmol/L    Anion gap 12 5 - 15 mmol/L    Glucose 111 (H) 65 - 100 mg/dL    BUN 13 6 - 20 MG/DL    Creatinine 1.02 0.70 - 1.30 MG/DL    BUN/Creatinine ratio 13 12 - 20      GFR est AA >60 >60 ml/min/1.73m2    GFR est non-AA >60 >60 ml/min/1.73m2    Calcium 9.1 8.5 - 10.1 MG/DL   CBC WITH AUTOMATED DIFF    Collection Time: 03/14/18  5:14 PM   Result Value Ref Range    WBC 9.0 4.1 - 11.1 K/uL    RBC 3.85 (L) 4.10 - 5.70 M/uL    HGB 11.6 (L) 12.1 - 17.0 g/dL    HCT 34.8 (L) 36.6 - 50.3 %    MCV 90.4 80.0 - 99.0 FL    MCH 30.1 26.0 - 34.0 PG    MCHC 33.3 30.0 - 36.5 g/dL    RDW 12.3 11.5 - 14.5 %    PLATELET 633 239 - 230 K/uL    MPV 11.0 8.9 - 12.9 FL    NRBC 0.0 0  WBC    ABSOLUTE NRBC 0.00 0.00 - 0.01 K/uL    NEUTROPHILS 58 32 - 75 %    LYMPHOCYTES 28 12 - 49 %    MONOCYTES 11 5 - 13 %    EOSINOPHILS 3 0 - 7 %    BASOPHILS 0 0 - 1 %    IMMATURE GRANULOCYTES 0 0.0 - 0.5 %    ABS. NEUTROPHILS 5.2 1.8 - 8.0 K/UL    ABS. LYMPHOCYTES 2.5 0.8 - 3.5 K/UL    ABS. MONOCYTES 1.0 0.0 - 1.0 K/UL    ABS. EOSINOPHILS 0.2 0.0 - 0.4 K/UL    ABS. BASOPHILS 0.0 0.0 - 0.1 K/UL    ABS. IMM. GRANS. 0.0 0.00 - 0.04 K/UL    DF AUTOMATED     TYPE & SCREEN    Collection Time: 03/14/18  5:14 PM   Result Value Ref Range    Crossmatch Expiration 03/17/2018     ABO/Rh(D) A POSITIVE     Antibody screen NEG        No results found.

## 2018-03-14 NOTE — CONSULTS
Urology Consult    Subjective:     Date of Consultation:  March 14, 2018    Referring Physician: chris    Reason for Consultation:  hematuria    History of Present Illness:   66 yo M h/o brachytherapy for PCa, admitted 1 week ago for gross hematuria and retention, resolved after irrigation. Saw Dr. Messi Christina in office with plan for cysto, possible biopsy in 6 days. Had repeat gross hematuria today. Timing once. Location urethra. A/w urinary frequency and low volume voids. Started Flomax a few days ago which improved outputs. Also having pain/irritation in prostate similar to after he had seeds placed. Sharpe placed today in ED and about 700mL clear urine drained and noted in bag. 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 Social History   Substance Use Topics    Smoking status: Former Smoker    Smokeless tobacco: Never Used      Comment: occational cigar    Alcohol use Yes      Comment: occational glass of wine     No Known Allergies   Prior to Admission medications    Medication Sig Start Date End Date Taking? Authorizing Provider   ciprofloxacin HCl (CIPRO) 500 mg tablet Take 1 Tab by mouth two (2) times a day for 14 days. 3/14/18 3/28/18 Yes Marie Ayala MD   tamsulosin (FLOMAX) 0.4 mg capsule Take 1 Cap by mouth daily for 14 days. 3/14/18 3/28/18 Yes Marie Ayala MD   zolpidem (AMBIEN) 5 mg tablet Take 5 mg by mouth nightly as needed for Sleep. Historical Provider   aspirin delayed-release 81 mg tablet Take 81 mg by mouth daily. Arsenio Nathan MD   sildenafil citrate (VIAGRA) 50 mg tablet Take 50 mg by mouth as needed. Arsenio Nathan MD   cefdinir (OMNICEF) 300 mg capsule Take 1 Cap by mouth two (2) times a day.  3/9/18   Tony Glass MD   HYDROcodone-acetaminophen (NORCO) 5-325 mg per tablet Take 1 Tab by mouth every four (4) hours as needed for Pain. Max Daily Amount: 6 Tabs. 3/9/18   Nicholas Sanches MD   AMLODIPINE BESYLATE (AMLODIPINE PO) Take  by mouth. Arsenio Nathan MD         Review of Systems:  A comprehensive review of systems was negative except for that written in the HPI. Objective:     Patient Vitals for the past 8 hrs:   BP Temp Pulse Resp SpO2 Weight   18 1642 161/90 97.8 °F (36.6 °C) (!) 102 18 99 % 74.8 kg (165 lb)     Temp (24hrs), Av.8 °F (36.6 °C), Min:97.8 °F (36.6 °C), Max:97.8 °F (36.6 °C)      Intake and Output:        Physical Exam:            General:    alert, cooperative, no distress, appears stated age                     Skin:  no rash or abnormalities                HEENT:  Normal        Throat/Neck:  normal and mucous membranes moist   Lymph nodes:  deferred                 Lungs:  sym chest rise      Cardiovascular:  RRR on monitor             Abdomen[de-identified]  s/ntnd             Genitalia:  anders draining clear yellow urine, no blood noted          Extremities:  GATES       Assessment:     Active Problems:    * No active hospital problems. *       ?prostatitis in setting of prior radiation causing gross hematuria and retention. No signs of continued bleeding and h/h actually higher than was at time of admission last week.     Plan:     -Keep anders to gravity, hydrate well  -treat empirically for prostatitis with cipro x2 weeks, can be increased per Dr. Red Alicea, ER staff to give additional scrip for 2 weeks  -plan on cysto/biopsy as scheduled next week, patient to call with issues in interim  -d/c home    Signed By: Liliya Mitchell MD                         2018

## 2018-03-15 LAB
ABO + RH BLD: NORMAL
BACTERIA SPEC CULT: NORMAL
BLOOD GROUP ANTIBODIES SERPL: NORMAL
SERVICE CMNT-IMP: NORMAL
SPECIMEN EXP DATE BLD: NORMAL